# Patient Record
Sex: FEMALE | Race: WHITE | NOT HISPANIC OR LATINO | URBAN - METROPOLITAN AREA
[De-identification: names, ages, dates, MRNs, and addresses within clinical notes are randomized per-mention and may not be internally consistent; named-entity substitution may affect disease eponyms.]

---

## 2021-05-19 ENCOUNTER — HISTORICAL (OUTPATIENT)
Dept: ADMINISTRATIVE | Facility: HOSPITAL | Age: 46
End: 2021-05-19

## 2021-12-27 LAB
INFLUENZA A ANTIGEN, POC: NEGATIVE
INFLUENZA B ANTIGEN, POC: NEGATIVE
SARS-COV-2 RNA RESP QL NAA+PROBE: POSITIVE

## 2022-04-11 ENCOUNTER — HISTORICAL (OUTPATIENT)
Dept: ADMINISTRATIVE | Facility: HOSPITAL | Age: 47
End: 2022-04-11

## 2022-04-27 VITALS
DIASTOLIC BLOOD PRESSURE: 98 MMHG | HEIGHT: 67 IN | BODY MASS INDEX: 26.99 KG/M2 | SYSTOLIC BLOOD PRESSURE: 143 MMHG | OXYGEN SATURATION: 98 % | WEIGHT: 171.94 LBS

## 2022-05-05 NOTE — HISTORICAL [facility]
This is a historical note converted from [place]. Formatting and pictures may have been removed.  Please reference Arlin for original formatting and attached multimedia. Chief Complaint  Bilateral elbow pain. Pt stated the pain is on the outside of her elbows. was told it is possibly tennis elbow.  History of Present Illness  45-year-old right-hand-dominant female presents office today for evaluation of her bilateral elbow pain.? Her pain is noted over the lateral aspects of both elbows and has been present for couple of months.? She started playing tennis back in January and noted the pain?following?afterwards.? She has?discomfort when gripping objects. ?She denies any?swelling?or any injuries. ?She denies any numbness or tingling. ?She has tried tennis elbow?braces with no?significant pain relief  Review of Systems  Systemic: No fever, no chills, and no recent weight change.  Head: No headache - frequent.  Eyes: No vision problems.  Otolarnygeal: No hearing loss, no earache, no epistaxis, no hoarseness, and no tooth pain. Gums normal.  Cardiovascular: No chest pain or discomfort and no palpitations.  Pulmonary: No pulmonary symptoms - no dyspnea, no shortness of breath  Gastrointestinal: Appetite not decreased. No dysphagia and no constant eructation. No nausea, no vomiting, no abdominal pain, no hematochezia.  Genitourinary: No genitourinary symptoms - No urinary hesitancy. No urinary loss of control - no burning sensation during urination.  Musculoskeletal: No calf muscle cramps and no localized soft tissue swelling  Neurological: No fainting and no convulsions.  Psychological: no depression.  Skin: No rash.  Physical Exam  Vitals & Measurements  T:?36.2? ?C (Oral)? HR:?75(Peripheral)? BP:?122/94?  HT:?170.00?cm? WT:?78.010?kg? BMI:?26.99?  right and left elbow exam:  no swelling or erythema  tender over lateral epicondyle and ECRB insertion of both elbows  No medial sided tenderness  pain with resisted wrist  extension, bilaterally  full ROM of both elbows  NVI  ?  right and left xrays taken today show no osteoarticular abnormalities  ?  After verbal consent the?right elbow was prepped in sterile fashion.? 1 cc of lidocaine and 1 cc of betamethasone was injected into the?ECRB insertion?on 25-gauge needle. ?Patient tolerated procedure well  After verbal consent the left elbow was prepped in a sterile fashion. ?1 cc of lidocaine and 1 cc of betamethasone was injected into the ECRB insertion on 25-gauge needle. ?Patient tolerated the procedure well  Assessment/Plan  1.?Bilateral tennis elbow?M77.11  ?Corticosteroid injection to both elbows today. ?Recommend physical therapy program and continue counterforce?brace as tolerated. ?She will follow-up with us as needed  Ordered:  betamethasone, 6 mg, Intra-Articular, Once, first dose 05/19/21 10:00:00 CDT, stop date 05/19/21 10:00:00 CDT  betamethasone, 6 mg, Intra-Articular, Once, first dose 05/19/21 10:00:00 CDT, stop date 05/19/21 10:00:00 CDT  Lidocaine inj., 1 mL, Intra-Articular, Once, first dose 05/19/21 9:32:00 CDT, stop date 05/19/21 9:32:00 CDT  Lidocaine inj., 1 mL, Intra-Articular, Once, first dose 05/19/21 9:32:00 CDT, stop date 05/19/21 9:32:00 CDT  Clinic Follow-up PRN, 05/19/21 9:32:00 CDT, Future Order, [facility]  Injections Tendon Sheath/lig [id number] PC, 05/19/21 9:32:00 CDT, [facility], Routine, 05/19/21 9:32:00 CDT, Bilateral tennis elbow  Injections Tendon Sheath/lig [id number] PC, 05/19/21 9:32:00 CDT, [facility], Routine, 05/19/21 9:32:00 CDT, Bilateral tennis elbow  PT/OT External Referral, 05/19/21 9:33:00 CDT, Bilateral tennis elbow, Evaluate and Treat, 3 X Week, Standard Precautions, ***General PT Orders**  ?  Referrals  Clinic Follow-up PRN, 05/19/21 9:32:00 CDT, Future Order, [facility]  PT/OT External Referral, 05/19/21 9:33:00 CDT, Bilateral tennis elbow, Evaluate and Treat, 3 X Week, Standard Precautions, ***General PT  Orders**   Problem List/Past Medical History  Ongoing  Bilateral tennis elbow  HTN - Hypertension  Historical  Cervical dysplasia  Procedure/Surgical History  Other manually assisted delivery (2013)  elective  (2010)  leep of cervix (1993)  D&C elective AB ()  lt breast bx-benign (1992)  Vaginal hysterectomy, for uterus 250 g or less;   Medications  Celestone, 6 mg, Intra-Articular, Once  Celestone, 6 mg, Intra-Articular, Once  hydrochlorothiazide 25 mg oral tablet, 25 mg= 1 tab(s), Oral, Daily  lidocaine 2% injectable solution, 1 mL, Intra-Articular, Once  lidocaine 2% injectable solution, 1 mL, Intra-Articular, Once  losartan 50 mg oral tablet, 50 mg= 1 tab(s), Oral, Daily  LOSARTAN-HCTZ 50-12.5 MG TAB,? ?Not taking  SERTRALINE  MG TABLET  Allergies  No Known Allergies  Social History  Abuse/Neglect  No, 2021  Alcohol - Denies Alcohol Use, 2013  Current, Wine, 1-2 times per week, 2018  Substance Use - Denies Substance Abuse, 2013  Never, 2018  Tobacco - Denies Tobacco Use, 2013  Never (less than 100 in lifetime), No, 2021  Family History  Diabetes mellitus type 2: Father.  Hypertension: Father.  Stroke: Father.  Health Maintenance  Health Maintenance  ???Pending?(in the next year)  ??? ??OverDue  ??? ? ? ?Alcohol Misuse Screening due??21??and every 1??year(s)  ??? ??Due?  ??? ? ? ?ADL Screening due??21??and every 1??year(s)  ??? ? ? ?Hypertension Management-Education due??21??and every 1??year(s)  ??? ? ? ?Tetanus Vaccine due??21??and every 10??year(s)  ??? ??Due In Future?  ??? ? ? ?Obesity Screening not due until??22??and every 1??year(s)  ???Satisfied?(in the past 1 year)  ??? ??Satisfied?  ??? ? ? ?Blood Pressure Screening on??21.??Satisfied by Tammi Lees LPN  ??? ? ? ?Body Mass Index Check on??21.??Satisfied by Tammi Lees LPN.  ??? ? ? ?Cervical Cancer Screening  on??08/20/20.??Satisfied by Romelia Myers  ??? ? ? ?Depression Screening on??05/19/21.??Satisfied by Tammi Lees LPN  ??? ? ? ?Hypertension Management-Blood Pressure on??05/19/21.??Satisfied by Tammi Lees LPN  ??? ? ? ?Obesity Screening on??05/19/21.??Satisfied by Tammi Lees LPN  ?

## 2022-09-22 ENCOUNTER — HISTORICAL (OUTPATIENT)
Dept: ADMINISTRATIVE | Facility: HOSPITAL | Age: 47
End: 2022-09-22
Payer: COMMERCIAL

## 2022-09-28 ENCOUNTER — HOSPITAL ENCOUNTER (OUTPATIENT)
Dept: RADIOLOGY | Facility: CLINIC | Age: 47
Discharge: HOME | End: 2022-09-28
Attending: PHYSICIAN ASSISTANT
Payer: COMMERCIAL

## 2022-09-28 ENCOUNTER — OFFICE VISIT (OUTPATIENT)
Dept: ORTHOPEDICS | Facility: CLINIC | Age: 47
End: 2022-09-28
Payer: COMMERCIAL

## 2022-09-28 VITALS
WEIGHT: 178.19 LBS | SYSTOLIC BLOOD PRESSURE: 124 MMHG | DIASTOLIC BLOOD PRESSURE: 82 MMHG | HEART RATE: 103 BPM | HEIGHT: 66 IN | BODY MASS INDEX: 28.64 KG/M2

## 2022-09-28 DIAGNOSIS — M25.571 ACUTE RIGHT ANKLE PAIN: ICD-10-CM

## 2022-09-28 DIAGNOSIS — M25.571 ACUTE RIGHT ANKLE PAIN: Primary | ICD-10-CM

## 2022-09-28 DIAGNOSIS — M76.821 POSTERIOR TIBIAL TENDINITIS OF RIGHT LOWER EXTREMITY: ICD-10-CM

## 2022-09-28 PROCEDURE — 99203 OFFICE O/P NEW LOW 30 MIN: CPT | Mod: ,,, | Performed by: PHYSICIAN ASSISTANT

## 2022-09-28 PROCEDURE — 1160F PR REVIEW ALL MEDS BY PRESCRIBER/CLIN PHARMACIST DOCUMENTED: ICD-10-PCS | Mod: CPTII,,, | Performed by: PHYSICIAN ASSISTANT

## 2022-09-28 PROCEDURE — 3008F PR BODY MASS INDEX (BMI) DOCUMENTED: ICD-10-PCS | Mod: CPTII,,, | Performed by: PHYSICIAN ASSISTANT

## 2022-09-28 PROCEDURE — 4010F ACE/ARB THERAPY RXD/TAKEN: CPT | Mod: CPTII,,, | Performed by: PHYSICIAN ASSISTANT

## 2022-09-28 PROCEDURE — 3008F BODY MASS INDEX DOCD: CPT | Mod: CPTII,,, | Performed by: PHYSICIAN ASSISTANT

## 2022-09-28 PROCEDURE — 73610 XR ANKLE COMPLETE 3 VIEW RIGHT: ICD-10-PCS | Mod: RT,,, | Performed by: PHYSICIAN ASSISTANT

## 2022-09-28 PROCEDURE — 73610 X-RAY EXAM OF ANKLE: CPT | Mod: RT,,, | Performed by: PHYSICIAN ASSISTANT

## 2022-09-28 PROCEDURE — 3074F PR MOST RECENT SYSTOLIC BLOOD PRESSURE < 130 MM HG: ICD-10-PCS | Mod: CPTII,,, | Performed by: PHYSICIAN ASSISTANT

## 2022-09-28 PROCEDURE — 4010F PR ACE/ARB THEARPY RXD/TAKEN: ICD-10-PCS | Mod: CPTII,,, | Performed by: PHYSICIAN ASSISTANT

## 2022-09-28 PROCEDURE — 3079F PR MOST RECENT DIASTOLIC BLOOD PRESSURE 80-89 MM HG: ICD-10-PCS | Mod: CPTII,,, | Performed by: PHYSICIAN ASSISTANT

## 2022-09-28 PROCEDURE — 1159F PR MEDICATION LIST DOCUMENTED IN MEDICAL RECORD: ICD-10-PCS | Mod: CPTII,,, | Performed by: PHYSICIAN ASSISTANT

## 2022-09-28 PROCEDURE — 1160F RVW MEDS BY RX/DR IN RCRD: CPT | Mod: CPTII,,, | Performed by: PHYSICIAN ASSISTANT

## 2022-09-28 PROCEDURE — 99203 PR OFFICE/OUTPT VISIT, NEW, LEVL III, 30-44 MIN: ICD-10-PCS | Mod: ,,, | Performed by: PHYSICIAN ASSISTANT

## 2022-09-28 PROCEDURE — 3074F SYST BP LT 130 MM HG: CPT | Mod: CPTII,,, | Performed by: PHYSICIAN ASSISTANT

## 2022-09-28 PROCEDURE — 1159F MED LIST DOCD IN RCRD: CPT | Mod: CPTII,,, | Performed by: PHYSICIAN ASSISTANT

## 2022-09-28 PROCEDURE — 3079F DIAST BP 80-89 MM HG: CPT | Mod: CPTII,,, | Performed by: PHYSICIAN ASSISTANT

## 2022-09-28 RX ORDER — ALPRAZOLAM 0.5 MG/1
0.5 TABLET ORAL DAILY PRN
COMMUNITY
Start: 2022-04-13

## 2022-09-28 RX ORDER — BUSPIRONE HYDROCHLORIDE 5 MG/1
5 TABLET ORAL 3 TIMES DAILY PRN
COMMUNITY
Start: 2022-04-12

## 2022-09-28 RX ORDER — ACETAMINOPHEN 500 MG
5000 TABLET ORAL
COMMUNITY

## 2022-09-28 RX ORDER — SERTRALINE HYDROCHLORIDE 100 MG/1
100 TABLET, FILM COATED ORAL DAILY
COMMUNITY
Start: 2022-08-16

## 2022-09-28 RX ORDER — LOSARTAN POTASSIUM 50 MG/1
50 TABLET ORAL
COMMUNITY

## 2022-09-28 RX ORDER — HYDROCHLOROTHIAZIDE 25 MG/1
25 TABLET ORAL DAILY
COMMUNITY
Start: 2022-08-16

## 2022-09-28 RX ORDER — MULTIVITAMIN
1 TABLET ORAL DAILY
COMMUNITY

## 2022-09-28 NOTE — PROGRESS NOTES
Chief Complaint:   Chief Complaint   Patient presents with    Ankle Pain     pt states that she twisted or sprained her right ankle in 03/2022. pt states that she has never had any previous imaging done or seen anyone else for this. pt states that in the beginning she iced it but now only takes ibuprofen PRN. pt states that as of today she is having some pain.        History of present illness:    46-year-old female presents office today for evaluation for medial-sided right ankle pain. this has been present on and off since March.  She states that she sprained her right ankle in March 2022 buckling quite remember the exact mechanism. she has had some intermittent medial-sided ankle pain. currently takes ibuprofen which helps.  She denies swelling.    History reviewed. No pertinent past medical history.    Past Surgical History:   Procedure Laterality Date    LAPAROSCOPIC TOTAL HYSTERECTOMY         Current Outpatient Medications   Medication Sig    ALPRAZolam (XANAX) 0.5 MG tablet Take 0.5 mg by mouth daily as needed.    busPIRone (BUSPAR) 5 MG Tab Take 5 mg by mouth 3 (three) times daily as needed.    cholecalciferol, vitamin D3, 125 mcg (5,000 unit) Tab Take 5,000 Units by mouth.    hydroCHLOROthiazide (HYDRODIURIL) 25 MG tablet Take 25 mg by mouth once daily.    losartan (COZAAR) 50 MG tablet Take 50 mg by mouth.    multivitamin (THERAGRAN) per tablet Take 1 tablet by mouth once daily.    sertraline (ZOLOFT) 100 MG tablet Take 100 mg by mouth once daily.     No current facility-administered medications for this visit.       Review of patient's allergies indicates:  No Known Allergies    History reviewed. No pertinent family history.    Social History     Socioeconomic History    Marital status: Single   Tobacco Use    Smoking status: Never    Smokeless tobacco: Never         Review of Systems:    Constitution:   Denies chills, fever, and sweats.  HENT:   Denies headaches or blurry vision.  Cardiovascular:  " Denies chest pain or irregular heart beat.  Respiratory:   Denies cough or shortness of breath.  Gastrointestinal:  Denies abdominal pain, nausea, or vomiting.  Musculoskeletal:   Denies muscle cramps.  Neurological:   Denies dizziness or focal weakness.  Psychiatric/Behavior: Normal mental status.  Hematology/Lymph:  Denies bleeding problem or easy bruising/bleeding.  Skin:    Denies rash or suspicious lesions.    Examination:    Vital Signs:    Vitals:    09/28/22 1510 09/28/22 1511   BP: 124/82    Pulse: 103    Weight: 80.8 kg (178 lb 3.2 oz)    Height: 5' 6" (1.676 m)    PainSc:    2       Body mass index is 28.76 kg/m².    Constitution:   Well-developed, well nourished patient in no acute distress.  Neurological:   Alert and oriented x 3 and cooperative to examination.     Psychiatric/Behavior: Normal mental status.  Respiratory:   No shortness of breath.  Eyes:    Extraoccular muscles intact  Skin:    No scars, rash or suspicious lesions.    Physical Exam:     Right ankle exam   no swelling, effusion or erythema   She does have some tenderness over the posterior tibial tendon  No tenderness over the lateral malleolus  No tenderness over the medial malleolus  No tenderness over the lateral collateral ligaments  No instability on stress exam  Intact range of motion in plantar flexion, dorsiflexion, eversion and inversion against resistance  Sensation intact distally  Pedal pulses 2+     Right ankle radiographs taken office today, three views showed no osteoarticular abnormalities        Assessment:     Acute right ankle pain  -     Cancel: X-Ray Ankle 2 View Right; Future; Expected date: 09/28/2022    Posterior tibial tendinitis of right lower extremity        Plan:      I discussed exam and x-ray findings with the patient today.  I do feel she has some mild posterior tibial tendinitis.  I recommended some Voltaren gel, ankle brace in activities as tolerated.  If her symptoms persist or worsen then we have " discussed corticosteroid injection into the tendon sheath.  She will follow up with us as needed       Clinical Reference Documents Added to Patient Instructions         Document    ACTIVE RANGE OF MOTION EXERCISES, KNEES AND ANKLES (ENGLISH)            No follow-ups on file.    DISCLAIMER: This note may have been dictated using voice recognition software and may contain grammatical errors.

## 2023-02-01 ENCOUNTER — OFFICE VISIT (OUTPATIENT)
Dept: URGENT CARE | Facility: CLINIC | Age: 48
End: 2023-02-01
Payer: COMMERCIAL

## 2023-02-01 VITALS
HEART RATE: 76 BPM | DIASTOLIC BLOOD PRESSURE: 93 MMHG | BODY MASS INDEX: 28.61 KG/M2 | OXYGEN SATURATION: 98 % | SYSTOLIC BLOOD PRESSURE: 141 MMHG | RESPIRATION RATE: 20 BRPM | TEMPERATURE: 99 F | WEIGHT: 178 LBS | HEIGHT: 66 IN

## 2023-02-01 DIAGNOSIS — J02.9 SORE THROAT: ICD-10-CM

## 2023-02-01 DIAGNOSIS — U07.1 COVID-19: Primary | ICD-10-CM

## 2023-02-01 LAB
CTP QC/QA: YES
MOLECULAR STREP A: NEGATIVE
POC MOLECULAR INFLUENZA A AGN: NEGATIVE
POC MOLECULAR INFLUENZA B AGN: NEGATIVE
SARS-COV-2 RDRP RESP QL NAA+PROBE: POSITIVE

## 2023-02-01 PROCEDURE — 3080F DIAST BP >= 90 MM HG: CPT | Mod: CPTII,,, | Performed by: FAMILY MEDICINE

## 2023-02-01 PROCEDURE — 3077F PR MOST RECENT SYSTOLIC BLOOD PRESSURE >= 140 MM HG: ICD-10-PCS | Mod: CPTII,,, | Performed by: FAMILY MEDICINE

## 2023-02-01 PROCEDURE — 87502 INFLUENZA DNA AMP PROBE: CPT | Mod: QW,,, | Performed by: FAMILY MEDICINE

## 2023-02-01 PROCEDURE — 3080F PR MOST RECENT DIASTOLIC BLOOD PRESSURE >= 90 MM HG: ICD-10-PCS | Mod: CPTII,,, | Performed by: FAMILY MEDICINE

## 2023-02-01 PROCEDURE — 3008F BODY MASS INDEX DOCD: CPT | Mod: CPTII,,, | Performed by: FAMILY MEDICINE

## 2023-02-01 PROCEDURE — 87651 STREP A DNA AMP PROBE: CPT | Mod: QW,,, | Performed by: FAMILY MEDICINE

## 2023-02-01 PROCEDURE — 3008F PR BODY MASS INDEX (BMI) DOCUMENTED: ICD-10-PCS | Mod: CPTII,,, | Performed by: FAMILY MEDICINE

## 2023-02-01 PROCEDURE — 1160F RVW MEDS BY RX/DR IN RCRD: CPT | Mod: CPTII,,, | Performed by: FAMILY MEDICINE

## 2023-02-01 PROCEDURE — 87635 SARS-COV-2 COVID-19 AMP PRB: CPT | Mod: QW,,, | Performed by: FAMILY MEDICINE

## 2023-02-01 PROCEDURE — 87635: ICD-10-PCS | Mod: QW,,, | Performed by: FAMILY MEDICINE

## 2023-02-01 PROCEDURE — 1159F MED LIST DOCD IN RCRD: CPT | Mod: CPTII,,, | Performed by: FAMILY MEDICINE

## 2023-02-01 PROCEDURE — 87502 POCT INFLUENZA A/B MOLECULAR: ICD-10-PCS | Mod: QW,,, | Performed by: FAMILY MEDICINE

## 2023-02-01 PROCEDURE — 1160F PR REVIEW ALL MEDS BY PRESCRIBER/CLIN PHARMACIST DOCUMENTED: ICD-10-PCS | Mod: CPTII,,, | Performed by: FAMILY MEDICINE

## 2023-02-01 PROCEDURE — 3077F SYST BP >= 140 MM HG: CPT | Mod: CPTII,,, | Performed by: FAMILY MEDICINE

## 2023-02-01 PROCEDURE — 87651 POCT STREP A MOLECULAR: ICD-10-PCS | Mod: QW,,, | Performed by: FAMILY MEDICINE

## 2023-02-01 PROCEDURE — 1159F PR MEDICATION LIST DOCUMENTED IN MEDICAL RECORD: ICD-10-PCS | Mod: CPTII,,, | Performed by: FAMILY MEDICINE

## 2023-02-01 PROCEDURE — 99213 OFFICE O/P EST LOW 20 MIN: CPT | Mod: ,,, | Performed by: FAMILY MEDICINE

## 2023-02-01 PROCEDURE — 99213 PR OFFICE/OUTPT VISIT, EST, LEVL III, 20-29 MIN: ICD-10-PCS | Mod: ,,, | Performed by: FAMILY MEDICINE

## 2023-02-01 NOTE — PROGRESS NOTES
"Subjective:       Patient ID: Kalani Carmichael is a 47 y.o. female.    Vitals:  height is 5' 6" (1.676 m) and weight is 80.7 kg (178 lb). Her oral temperature is 99.3 °F (37.4 °C). Her blood pressure is 141/93 (abnormal) and her pulse is 76. Her respiration is 20 and oxygen saturation is 98%.     Chief Complaint: Sore Throat (Sore throat, congestion, headache x 3 days )    3 days of cough, congestion and pharyngitis.  No fever, pos elevated temperature.       Constitution: Negative for chills, fatigue and fever.   HENT:  Positive for postnasal drip. Negative for congestion, sinus pressure and trouble swallowing.    Neck: Negative for neck pain and neck stiffness.   Cardiovascular:  Negative for chest pain, leg swelling and sob on exertion.   Respiratory:  Positive for cough. Negative for chest tightness, shortness of breath and wheezing.    Neurological:  Negative for dizziness, disorientation and altered mental status.   Psychiatric/Behavioral:  Negative for altered mental status and disorientation.      Objective:      Physical Exam   Constitutional: She is oriented to person, place, and time. She appears well-developed. No distress.   HENT:   Head: Normocephalic.   Ears:   Right Ear: Tympanic membrane and external ear normal.   Left Ear: Tympanic membrane and external ear normal.   Nose: Rhinorrhea present.   Mouth/Throat: Uvula is midline and mucous membranes are normal. No uvula swelling. Cobblestoning present. No oropharyngeal exudate or posterior oropharyngeal edema. Tonsils are 0 on the right. Tonsils are 0 on the left. No tonsillar exudate.   Eyes: Pupils are equal, round, and reactive to light. Right eye exhibits no discharge. Left eye exhibits no discharge.   Neck: Neck supple. No tracheal deviation present.   Cardiovascular: Normal rate, regular rhythm and normal heart sounds.   No murmur heard.  Pulmonary/Chest: Effort normal and breath sounds normal. No stridor. No respiratory distress. She has " no wheezes.   Lymphadenopathy:     She has no cervical adenopathy.   Neurological: no focal deficit. She is alert and oriented to person, place, and time.   Skin: Skin is warm and dry. Capillary refill takes less than 2 seconds.   Psychiatric: Mood and thought content normal.   Nursing note and vitals reviewed.      Assessment:       1. COVID-19    2. Sore throat            Plan:         COVID-19    Sore throat  -     POCT COVID-19 Rapid Screening  -     POCT Influenza A/B Molecular  -     POCT Strep A, Molecular            COVID positive.   Flu A and strep negative.

## 2023-11-10 ENCOUNTER — OFFICE VISIT (OUTPATIENT)
Dept: URGENT CARE | Facility: CLINIC | Age: 48
End: 2023-11-10
Payer: COMMERCIAL

## 2023-11-10 VITALS
RESPIRATION RATE: 20 BRPM | SYSTOLIC BLOOD PRESSURE: 136 MMHG | OXYGEN SATURATION: 96 % | HEART RATE: 86 BPM | TEMPERATURE: 99 F | DIASTOLIC BLOOD PRESSURE: 86 MMHG | WEIGHT: 178 LBS | BODY MASS INDEX: 28.61 KG/M2 | HEIGHT: 66 IN

## 2023-11-10 DIAGNOSIS — R05.9 COUGH, UNSPECIFIED TYPE: ICD-10-CM

## 2023-11-10 DIAGNOSIS — J32.4 PANSINUSITIS, UNSPECIFIED CHRONICITY: Primary | ICD-10-CM

## 2023-11-10 PROCEDURE — 99213 OFFICE O/P EST LOW 20 MIN: CPT | Mod: ,,,

## 2023-11-10 PROCEDURE — 99213 PR OFFICE/OUTPT VISIT, EST, LEVL III, 20-29 MIN: ICD-10-PCS | Mod: ,,,

## 2023-11-10 RX ORDER — DOXYCYCLINE 100 MG/1
100 CAPSULE ORAL EVERY 12 HOURS
Qty: 14 CAPSULE | Refills: 0 | Status: SHIPPED | OUTPATIENT
Start: 2023-11-10 | End: 2023-11-17

## 2023-11-10 RX ORDER — BENZONATATE 100 MG/1
100 CAPSULE ORAL
COMMUNITY
Start: 2023-10-30

## 2023-11-10 RX ORDER — PREDNISONE 20 MG/1
20 TABLET ORAL 2 TIMES DAILY
Qty: 6 TABLET | Refills: 0 | Status: SHIPPED | OUTPATIENT
Start: 2023-11-10 | End: 2023-11-13

## 2023-11-10 NOTE — PATIENT INSTRUCTIONS
Take steroids with food.  Take antibiotics until complete.  Jaquelin cough every 8 hours as needed for cough, 10 mL.  Do not combine with other antihistamines or cough medication.  Drink plenty of fluids. Get plenty of rest.   Claritin or Zyrtec 10 mg for nasal congestion.  Nasal spray such as Nasacort or Flonase for congestion.  Warm saltwater gargles for sore throat.   Tylenol or ibuprofen as needed for sore throat and fever.   Chloraseptic Spray for worsening sore throat  Call or return to clinic as needed   Go to the ER with any significant change or worsening of symptoms.   Follow up with your primary care doctor.

## 2023-11-10 NOTE — PROGRESS NOTES
"Subjective:      Patient ID: Kalani Carmichael is a 48 y.o. female.    Vitals:  height is 5' 6" (1.676 m) and weight is 80.7 kg (178 lb). Her oral temperature is 98.8 °F (37.1 °C). Her blood pressure is 136/86 and her pulse is 86. Her respiration is 20 and oxygen saturation is 96%.     Chief Complaint: HA, Nasal Congestion, Cough (x1 month)    Patient is a 48-year-old female who presents to urgent care clinic with complaints of sinus pressure, nasal congestion, cough that has been present for 1 month.  Patient reports 2 weeks ago she was given steroid injection and B12 injection which did not help with symptoms.  Patient reports cough and sinus pressure worsening, denies fever, body aches, chills, neck stiffness, rash, GI symptoms, shortness of breath or chest pain.  She is using Tessalon Perles, Zyrtec and Mucinex.        HENT:  Positive for congestion and sinus pressure.    Respiratory:  Positive for cough.       Objective:     Physical Exam   Constitutional: She is oriented to person, place, and time. She appears well-developed. She is cooperative.  Non-toxic appearance. She does not appear ill. No distress.   HENT:   Head: Normocephalic and atraumatic.   Ears:   Right Ear: Hearing, tympanic membrane, external ear and ear canal normal.   Left Ear: Hearing, tympanic membrane, external ear and ear canal normal.   Nose: No mucosal edema, rhinorrhea or nasal deformity. No epistaxis. Right sinus exhibits maxillary sinus tenderness and frontal sinus tenderness. Left sinus exhibits maxillary sinus tenderness and frontal sinus tenderness.   Mouth/Throat: Uvula is midline, oropharynx is clear and moist and mucous membranes are normal. Mucous membranes are moist. No trismus in the jaw. Normal dentition. No uvula swelling. No oropharyngeal exudate, posterior oropharyngeal edema or posterior oropharyngeal erythema.   Eyes: Conjunctivae and lids are normal. No scleral icterus.   Neck: Trachea normal and phonation " normal. Neck supple. No edema present. No erythema present. No neck rigidity present.   Cardiovascular: Normal rate, regular rhythm, normal heart sounds and normal pulses.   Pulmonary/Chest: Effort normal and breath sounds normal. No respiratory distress. She has no decreased breath sounds. She has no rhonchi.   Abdominal: Normal appearance.   Musculoskeletal: Normal range of motion.         General: No deformity. Normal range of motion.   Neurological: She is alert and oriented to person, place, and time. She exhibits normal muscle tone. Coordination normal.   Skin: Skin is warm, dry, intact, not diaphoretic and not pale.   Psychiatric: Her speech is normal and behavior is normal. Judgment and thought content normal.   Nursing note and vitals reviewed.      Assessment:     1. Pansinusitis, unspecified chronicity    2. Cough, unspecified type        Plan:       Pansinusitis, unspecified chronicity  -     predniSONE (DELTASONE) 20 MG tablet; Take 1 tablet (20 mg total) by mouth 2 (two) times daily. for 3 days  Dispense: 6 tablet; Refill: 0  -     doxycycline (MONODOX) 100 MG capsule; Take 1 capsule (100 mg total) by mouth every 12 (twelve) hours. for 7 days  Dispense: 14 capsule; Refill: 0    Cough, unspecified type    Take steroids with food.  Take antibiotics until complete.  Ninja cough every 8 hours as needed for cough, 10 mL.  Do not combine with other antihistamines or cough medication.  Drink plenty of fluids. Get plenty of rest.   Claritin or Zyrtec 10 mg for nasal congestion.  Nasal spray such as Nasacort or Flonase for congestion.  Warm saltwater gargles for sore throat.   Tylenol or ibuprofen as needed for sore throat and fever.   Chloraseptic Spray for worsening sore throat  Call or return to clinic as needed   Go to the ER with any significant change or worsening of symptoms.   Follow up with your primary care doctor.

## 2025-05-28 ENCOUNTER — OFFICE VISIT (OUTPATIENT)
Dept: ORTHOPEDICS | Facility: CLINIC | Age: 50
End: 2025-05-28
Payer: COMMERCIAL

## 2025-05-28 VITALS
DIASTOLIC BLOOD PRESSURE: 83 MMHG | WEIGHT: 177.94 LBS | SYSTOLIC BLOOD PRESSURE: 128 MMHG | HEIGHT: 66 IN | BODY MASS INDEX: 28.6 KG/M2 | HEART RATE: 79 BPM

## 2025-05-28 DIAGNOSIS — M16.11 PRIMARY OSTEOARTHRITIS OF RIGHT HIP: Primary | ICD-10-CM

## 2025-05-28 PROCEDURE — 1159F MED LIST DOCD IN RCRD: CPT | Mod: CPTII,,, | Performed by: PHYSICIAN ASSISTANT

## 2025-05-28 PROCEDURE — 4010F ACE/ARB THERAPY RXD/TAKEN: CPT | Mod: CPTII,,, | Performed by: PHYSICIAN ASSISTANT

## 2025-05-28 PROCEDURE — 3008F BODY MASS INDEX DOCD: CPT | Mod: CPTII,,, | Performed by: PHYSICIAN ASSISTANT

## 2025-05-28 PROCEDURE — 1160F RVW MEDS BY RX/DR IN RCRD: CPT | Mod: CPTII,,, | Performed by: PHYSICIAN ASSISTANT

## 2025-05-28 PROCEDURE — 99204 OFFICE O/P NEW MOD 45 MIN: CPT | Mod: ,,, | Performed by: PHYSICIAN ASSISTANT

## 2025-05-28 PROCEDURE — 3079F DIAST BP 80-89 MM HG: CPT | Mod: CPTII,,, | Performed by: PHYSICIAN ASSISTANT

## 2025-05-28 PROCEDURE — 3074F SYST BP LT 130 MM HG: CPT | Mod: CPTII,,, | Performed by: PHYSICIAN ASSISTANT

## 2025-05-28 RX ORDER — ESTRADIOL 1 MG/1
1 TABLET ORAL DAILY
COMMUNITY

## 2025-05-28 RX ORDER — THYROID 120 MG/1
120 TABLET ORAL DAILY
COMMUNITY

## 2025-05-28 RX ORDER — TESTOSTERONE 10 MG/.5G
GEL, METERED TOPICAL
COMMUNITY

## 2025-05-28 RX ORDER — DICLOFENAC SODIUM 75 MG/1
75 TABLET, DELAYED RELEASE ORAL 2 TIMES DAILY
Qty: 60 TABLET | Refills: 3 | Status: SHIPPED | OUTPATIENT
Start: 2025-05-28

## 2025-05-28 NOTE — PROGRESS NOTES
Chief Complaint:   Chief Complaint   Patient presents with    Hip Pain     R hip - (x-ray @ [facility] ) ongoing for about 6 mth. Pain has gradually been getting worse. Pain is on/off. Has been going to physical therapy with no changes. Report pain in the front and radiating pain down the leg.        History of present illness:    This is a 49 y.o. year old   History of Present Illness    CHIEF COMPLAINT:  - Right hip pain    HPI:  Kalani presents with right hip pain, primarily localized in the groin area and radiating down both the front and back of the leg, sometimes extending to the foot. The pain is described as a combination of joint and nerve pain, and is constant. She reports pain throughout the day, including morning, night, and daytime hours. The hip feels notably tight.    She has consulted multiple practitioners for this issue, including Dr. Noriega, who attributed the pain to the back, and Dr. Coronado, who believed it was entirely hip-related. She expresses concern about the early onset of hip issues, noting that a sibling also requires a hip replacement.    She is planning a trip to [place] in the next month and is seeking treatment options to manage pain during the three-week trip.    Kalani denies significant back pain, knee pain, or left hip pain. She denies any known medical diagnoses related to the current condition. PT: Provided minimal benefit    IMAGING:  - XR Right Hip and L Spine: The right hip shows significant arthritis with bone spurs, flattened femoral head, bone-on-bone contact at the top and bottom of the joint, and sclerosis. The left hip shows mild arthritis at the bottom. The back XRs show mild degenerative disc changes.          Current Outpatient Medications   Medication Sig    ALPRAZolam (XANAX) 0.5 MG tablet Take 0.5 mg by mouth daily as needed.    benzonatate (TESSALON) 100 MG capsule Take 100 mg by mouth.    busPIRone (BUSPAR) 5 MG Tab Take 5 mg by mouth 3 (three) times daily as needed.     "cholecalciferol, vitamin D3, 125 mcg (5,000 unit) Tab Take 5,000 Units by mouth.    hydroCHLOROthiazide (HYDRODIURIL) 25 MG tablet Take 25 mg by mouth once daily.    losartan (COZAAR) 50 MG tablet Take 50 mg by mouth.    multivitamin (THERAGRAN) per tablet Take 1 tablet by mouth once daily.    sertraline (ZOLOFT) 100 MG tablet Take 100 mg by mouth once daily.    estradioL (ESTRACE) 1 MG tablet Take 1 mg by mouth once daily.    PROGESTERONE MISC by Misc.(Non-Drug; Combo Route) route.    testosterone 10 mg/0.5 gram /actuation GlPm Place onto the skin.    thyroid,pork (ARMOUR THYROID ORAL) Take by mouth.     No current facility-administered medications for this visit.       Review of Systems:    Constitution:   Denies chills, fever, and sweats.  HENT:   Denies headaches or blurry vision.  Cardiovascular:  Denies chest pain or irregular heart beat.  Respiratory:   Denies cough or shortness of breath.  Gastrointestinal:  Denies abdominal pain, nausea, or vomiting.  Musculoskeletal:   Denies muscle cramps.  Neurological:   Denies dizziness or focal weakness.  Psychiatric/Behavior: Normal mental status.  Hematology/Lymph:  Denies bleeding problem or easy bruising/bleeding.  Skin:    Denies rash or suspicious lesions.    Examination:    Vital Signs:    Vitals:    05/28/25 1013   BP: 128/83   Pulse: 79   Weight: 80.7 kg (177 lb 14.6 oz)   Height: 5' 6" (1.676 m)       Body mass index is 28.72 kg/m².    Constitution:   Well-developed, well nourished patient in no acute distress.  Neurological:   Alert and oriented x 3 and cooperative to examination.     Psychiatric/Behavior: Normal mental status.  Respiratory:   No shortness of breath.  Eyes:    Extraoccular muscles intact  Skin:    No scars, rash or suspicious lesions.    Physical Exam:   Hip Exam:  Right  No obvious deformity.   Flexion to 120 degrees and internal and external rotation to 40 degrees.   Abduction to 45 degree and adduction to 30 degrees.   Positive OLIVE " test.   Positive Stinchfield test.   No flexion contracture.   Negative  greater trochanteric tenderness.   Negative RUSSELL test.   5/5 strength, normal skin appearance and palpable pulses distally. Sensibility normal.  Limited room range of motion with flexion and rotation.    Patient has pain with flexion internal rotation of the hip    Imaging: X-rays reviewed from outside facility.    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    Inferior aspect has significant narrowing as well along with hypertrophic osteophyte formation       Assessment: There are no diagnoses linked to this encounter.     Plan:    Assessment & Plan    OSTEOARTHRITIS OF HIP:  - Discussed left hip injection under X-ray guidance as potential temporary relief for hip pain before upcoming [place] trip. Injection could provide relief lasting from a few weeks to a few months.  - Considered oral cortisone as alternative short-term solution, suggesting to start the week before trip. Consider starting oral cortisone on Monday before trip.  - Total hip replacement is a future possibility, typically done when absolutely necessary due to patient's young age (49 years old). Total hip replacement could last 20 years or more, but has limitations on high-impact activities.  - Perform aquatic therapy or pool work for hip pain.  - Avoid high-impact activities.    FOLLOW-UP:  - spoke with Dr. Smith and he came and spoke with the patient as well we will plan on an intra-articular fluoroscopically guided left hip injection without sedation next week               This note was generated with the assistance of ambient listening technology. Verbal consent was obtained by the patient and accompanying visitor(s) for the recording of patient appointment to facilitate this note. I attest to having reviewed and edited the generated note for  accuracy, though some syntax or spelling errors may persist. Please contact the author of this note for any clarification.       DISCLAIMER: This note may have been dictated using voice recognition software and may contain grammatical errors.     NOTE: Consult report sent to referring provider via [email or web address] EMR.

## 2025-06-03 ENCOUNTER — HOSPITAL ENCOUNTER (OUTPATIENT)
Facility: HOSPITAL | Age: 50
Discharge: HOME | End: 2025-06-03
Attending: SPECIALIST | Admitting: SPECIALIST
Payer: COMMERCIAL

## 2025-06-03 DIAGNOSIS — M16.11 PRIMARY OSTEOARTHRITIS OF RIGHT HIP: ICD-10-CM

## 2025-06-03 PROCEDURE — 77002 NEEDLE LOCALIZATION BY XRAY: CPT | Mod: 26,,, | Performed by: SPECIALIST

## 2025-06-03 PROCEDURE — 20610 DRAIN/INJ JOINT/BURSA W/O US: CPT | Performed by: SPECIALIST

## 2025-06-03 PROCEDURE — 20610 DRAIN/INJ JOINT/BURSA W/O US: CPT | Mod: RT,,, | Performed by: SPECIALIST

## 2025-06-03 PROCEDURE — 63600175 PHARM REV CODE 636 W HCPCS: Performed by: SPECIALIST

## 2025-06-03 RX ORDER — LIDOCAINE HYDROCHLORIDE 10 MG/ML
INJECTION, SOLUTION INFILTRATION; PERINEURAL
Status: DISCONTINUED
Start: 2025-06-03 | End: 2025-06-03 | Stop reason: HOSPADM

## 2025-06-03 RX ORDER — MELOXICAM 15 MG/1
15 TABLET ORAL DAILY
Qty: 30 TABLET | Refills: 0 | Status: SHIPPED | OUTPATIENT
Start: 2025-06-03 | End: 2025-07-03

## 2025-06-03 RX ORDER — BETAMETHASONE SODIUM PHOSPHATE AND BETAMETHASONE ACETATE 3; 3 MG/ML; MG/ML
INJECTION, SUSPENSION INTRA-ARTICULAR; INTRALESIONAL; INTRAMUSCULAR; SOFT TISSUE
Status: DISCONTINUED | OUTPATIENT
Start: 2025-06-03 | End: 2025-06-03 | Stop reason: HOSPADM

## 2025-06-03 RX ORDER — LIDOCAINE HYDROCHLORIDE 10 MG/ML
INJECTION, SOLUTION EPIDURAL; INFILTRATION; INTRACAUDAL; PERINEURAL
Status: DISCONTINUED | OUTPATIENT
Start: 2025-06-03 | End: 2025-06-03 | Stop reason: HOSPADM

## 2025-06-03 RX ORDER — BETAMETHASONE SODIUM PHOSPHATE AND BETAMETHASONE ACETATE 3; 3 MG/ML; MG/ML
INJECTION, SUSPENSION INTRA-ARTICULAR; INTRALESIONAL; INTRAMUSCULAR; SOFT TISSUE
Status: DISCONTINUED
Start: 2025-06-03 | End: 2025-06-03 | Stop reason: HOSPADM

## 2025-06-04 VITALS
HEIGHT: 66 IN | RESPIRATION RATE: 20 BRPM | HEART RATE: 65 BPM | DIASTOLIC BLOOD PRESSURE: 86 MMHG | TEMPERATURE: 97 F | WEIGHT: 168.19 LBS | BODY MASS INDEX: 27.03 KG/M2 | SYSTOLIC BLOOD PRESSURE: 123 MMHG | OXYGEN SATURATION: 98 %

## 2025-06-30 RX ORDER — MELOXICAM 15 MG/1
15 TABLET ORAL
Qty: 30 TABLET | Refills: 0 | Status: SHIPPED | OUTPATIENT
Start: 2025-06-30

## 2025-07-29 RX ORDER — MELOXICAM 15 MG/1
15 TABLET ORAL
Qty: 30 TABLET | Refills: 0 | Status: SHIPPED | OUTPATIENT
Start: 2025-07-29

## 2025-08-29 ENCOUNTER — TELEPHONE (OUTPATIENT)
Dept: ORTHOPEDICS | Facility: CLINIC | Age: 50
End: 2025-08-29
Payer: COMMERCIAL

## (undated) DEVICE — CONTAINER SPECIMEN SCREW 4OZ

## (undated) DEVICE — SYR W NDL BLN FILL 5ML 18GX1.5

## (undated) DEVICE — TOWEL OR DISP STRL BLUE 4/PK

## (undated) DEVICE — APPLICATOR CHLORAPREP ORN 26ML

## (undated) DEVICE — SYR 30CC LUER LOCK

## (undated) DEVICE — NDL ANES SPINAL 18X3.5ST 18G

## (undated) DEVICE — SYR 10CC LUER LOCK

## (undated) DEVICE — CONTRAST ISOVUE 300 50ML

## (undated) DEVICE — Device

## (undated) DEVICE — NDL HYPO REG 25G X 1 1/2

## (undated) DEVICE — STRAP POS KNEE BODY 4X60IN

## (undated) DEVICE — KIT SURGICAL TURNOVER

## (undated) DEVICE — BANDAGE ADHESIVE FABRIC 2X4

## (undated) DEVICE — DRAPE MEDIUM SHEET 40X70IN

## (undated) DEVICE — SYR DISP LL 5CC

## (undated) DEVICE — GLOVE SENSICARE PI MICRO 8